# Patient Record
Sex: MALE | Race: AMERICAN INDIAN OR ALASKA NATIVE
[De-identification: names, ages, dates, MRNs, and addresses within clinical notes are randomized per-mention and may not be internally consistent; named-entity substitution may affect disease eponyms.]

---

## 2018-06-27 ENCOUNTER — HOSPITAL ENCOUNTER (EMERGENCY)
Dept: HOSPITAL 5 - ED | Age: 58
Discharge: HOME | End: 2018-06-27
Payer: MEDICARE

## 2018-06-27 VITALS — SYSTOLIC BLOOD PRESSURE: 172 MMHG | DIASTOLIC BLOOD PRESSURE: 101 MMHG
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
## 2018-06-27 DIAGNOSIS — F43.10: ICD-10-CM
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
## 2018-06-27 DIAGNOSIS — S09.8XXA: Primary | ICD-10-CM

## 2018-06-27 DIAGNOSIS — F17.200: ICD-10-CM

## 2018-06-27 DIAGNOSIS — F20.0: ICD-10-CM
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
## 2018-06-27 PROCEDURE — 99282 EMERGENCY DEPT VISIT SF MDM: CPT
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
## 2019-02-15 ENCOUNTER — HOSPITAL ENCOUNTER (EMERGENCY)
Dept: HOSPITAL 5 - ED | Age: 59
LOS: 1 days | Discharge: HOME | End: 2019-02-16
Payer: SELF-PAY

## 2019-02-15 DIAGNOSIS — F43.10: ICD-10-CM

## 2019-02-15 DIAGNOSIS — F20.0: ICD-10-CM

## 2019-02-15 DIAGNOSIS — R07.89: Primary | ICD-10-CM

## 2019-02-15 LAB
ALBUMIN SERPL-MCNC: 4.1 G/DL (ref 3.9–5)
ALT SERPL-CCNC: 32 UNITS/L (ref 7–56)
BASOPHILS # (AUTO): 0.1 K/MM3 (ref 0–0.1)
BASOPHILS NFR BLD AUTO: 0.7 % (ref 0–1.8)
BENZODIAZEPINES SCREEN,URINE: (no result)
BILIRUB UR QL STRIP: (no result)
BLOOD UR QL VISUAL: (no result)
BUN SERPL-MCNC: 19 MG/DL (ref 9–20)
BUN/CREAT SERPL: 16 %
CALCIUM SERPL-MCNC: 9.4 MG/DL (ref 8.4–10.2)
EOSINOPHIL # BLD AUTO: 0.2 K/MM3 (ref 0–0.4)
EOSINOPHIL NFR BLD AUTO: 1.5 % (ref 0–4.3)
HCT VFR BLD CALC: 44.8 % (ref 35.5–45.6)
HEMOLYSIS INDEX: 4
HGB BLD-MCNC: 15 GM/DL (ref 11.8–15.2)
LYMPHOCYTES # BLD AUTO: 1.6 K/MM3 (ref 1.2–5.4)
LYMPHOCYTES NFR BLD AUTO: 10.6 % (ref 13.4–35)
MCHC RBC AUTO-ENTMCNC: 33 % (ref 32–34)
MCV RBC AUTO: 88 FL (ref 84–94)
METHADONE SCREEN,URINE: (no result)
MONOCYTES # (AUTO): 1.6 K/MM3 (ref 0–0.8)
MONOCYTES % (AUTO): 10.6 % (ref 0–7.3)
OPIATE SCREEN,URINE: (no result)
PH UR STRIP: 5 [PH] (ref 5–7)
PLATELET # BLD: 209 K/MM3 (ref 140–440)
PROT UR STRIP-MCNC: (no result) MG/DL
RBC # BLD AUTO: 5.08 M/MM3 (ref 3.65–5.03)
RBC #/AREA URNS HPF: 1 /HPF (ref 0–6)
UROBILINOGEN UR-MCNC: < 2 MG/DL (ref ?–2)
WBC #/AREA URNS HPF: 1 /HPF (ref 0–6)

## 2019-02-15 PROCEDURE — 93005 ELECTROCARDIOGRAM TRACING: CPT

## 2019-02-15 PROCEDURE — 80053 COMPREHEN METABOLIC PANEL: CPT

## 2019-02-15 PROCEDURE — 71046 X-RAY EXAM CHEST 2 VIEWS: CPT

## 2019-02-15 PROCEDURE — 85025 COMPLETE CBC W/AUTO DIFF WBC: CPT

## 2019-02-15 PROCEDURE — 99284 EMERGENCY DEPT VISIT MOD MDM: CPT

## 2019-02-15 PROCEDURE — 93010 ELECTROCARDIOGRAM REPORT: CPT

## 2019-02-15 PROCEDURE — 80307 DRUG TEST PRSMV CHEM ANLYZR: CPT

## 2019-02-15 PROCEDURE — 81001 URINALYSIS AUTO W/SCOPE: CPT

## 2019-02-15 PROCEDURE — 80164 ASSAY DIPROPYLACETIC ACD TOT: CPT

## 2019-02-15 PROCEDURE — 84484 ASSAY OF TROPONIN QUANT: CPT

## 2019-02-15 PROCEDURE — 36415 COLL VENOUS BLD VENIPUNCTURE: CPT
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
## 2019-02-16 VITALS — DIASTOLIC BLOOD PRESSURE: 78 MMHG | SYSTOLIC BLOOD PRESSURE: 130 MMHG
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
## 2019-02-18 NOTE — XRAY REPORT
FINAL REPORT



EXAM:  XR CHEST ROUTINE 2V



HISTORY:  Chest Pain 



TECHNIQUE:  PA and lateral views of the chest



PRIORS:  None.



FINDINGS:  

Lines, tubes, and devices: N/A



Lungs and pleura: Trachea is normal in position.  Lungs are clear of infiltrate, pleural effusion, va
scular congestion, or pneumothorax. 



Cardiomediastinal silhouette: Cardiac and mediastinal silhouettes are unremarkable.



Other: Bony structures are intact.



IMPRESSION:  

No acute cardiopulmonary process seen.

## 2019-04-09 ENCOUNTER — HOSPITAL ENCOUNTER (EMERGENCY)
Dept: HOSPITAL 87 - ER | Age: 59
Discharge: LEFT BEFORE DISCHARGE | End: 2019-04-09
Payer: SELF-PAY

## 2019-04-09 VITALS — WEIGHT: 158.73 LBS | HEIGHT: 68 IN | BODY MASS INDEX: 24.06 KG/M2

## 2019-04-09 VITALS — SYSTOLIC BLOOD PRESSURE: 144 MMHG | DIASTOLIC BLOOD PRESSURE: 74 MMHG

## 2019-04-09 DIAGNOSIS — Z53.21: ICD-10-CM

## 2019-04-09 DIAGNOSIS — M54.5: Primary | ICD-10-CM

## 2019-04-10 ENCOUNTER — HOSPITAL ENCOUNTER (EMERGENCY)
Dept: HOSPITAL 87 - ER | Age: 59
Discharge: HOME | End: 2019-04-10
Payer: SELF-PAY

## 2019-04-10 VITALS — WEIGHT: 160.94 LBS | BODY MASS INDEX: 24.39 KG/M2 | HEIGHT: 68 IN

## 2019-04-10 VITALS — SYSTOLIC BLOOD PRESSURE: 100 MMHG | DIASTOLIC BLOOD PRESSURE: 55 MMHG

## 2019-04-10 DIAGNOSIS — F17.200: ICD-10-CM

## 2019-04-10 DIAGNOSIS — F43.10: ICD-10-CM

## 2019-04-10 DIAGNOSIS — Z59.0: ICD-10-CM

## 2019-04-10 DIAGNOSIS — M79.674: Primary | ICD-10-CM

## 2019-04-10 DIAGNOSIS — I10: ICD-10-CM

## 2019-04-10 DIAGNOSIS — F20.9: ICD-10-CM

## 2019-04-10 PROCEDURE — 99283 EMERGENCY DEPT VISIT LOW MDM: CPT

## 2019-04-10 SDOH — ECONOMIC STABILITY - HOUSING INSECURITY: HOMELESSNESS: Z59.0
